# Patient Record
Sex: MALE | URBAN - METROPOLITAN AREA
[De-identification: names, ages, dates, MRNs, and addresses within clinical notes are randomized per-mention and may not be internally consistent; named-entity substitution may affect disease eponyms.]

---

## 2018-10-05 ENCOUNTER — APPOINTMENT (RX ONLY)
Dept: URBAN - METROPOLITAN AREA CLINIC 152 | Facility: CLINIC | Age: 78
Setting detail: DERMATOLOGY
End: 2018-10-05

## 2018-10-05 DIAGNOSIS — L40.0 PSORIASIS VULGARIS: ICD-10-CM

## 2018-10-05 PROCEDURE — ? COUNSELING

## 2018-10-05 PROCEDURE — ? PRESCRIPTION MEDICATION MANAGEMENT

## 2018-10-05 PROCEDURE — 99213 OFFICE O/P EST LOW 20 MIN: CPT

## 2018-10-05 NOTE — PROCEDURE: PRESCRIPTION MEDICATION MANAGEMENT
Detail Level: Zone
Continue Regimen: fluocinonide 0.05% ointment QHS x 1 week, then TAC 0.1% ointment QHS
Render In Strict Bullet Format?: No
Plan: Advised patient to apply steroid ointment under occlusion at night.

## 2018-10-05 NOTE — PROCEDURE: COUNSELING
Detail Level: Zone
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 Informed patient condition is not fungal related. Treatment is with a topical steroid. Patient should apply topical steroid with saran wrap nightly. During the day, he should wrap his foot with a non-stick pad and coban wrap to reduce pain.

## 2018-10-12 ENCOUNTER — APPOINTMENT (RX ONLY)
Dept: URBAN - METROPOLITAN AREA CLINIC 152 | Facility: CLINIC | Age: 78
Setting detail: DERMATOLOGY
End: 2018-10-12

## 2018-10-12 DIAGNOSIS — L40.0 PSORIASIS VULGARIS: ICD-10-CM

## 2018-10-12 PROBLEM — E13.9 OTHER SPECIFIED DIABETES MELLITUS WITHOUT COMPLICATIONS: Status: ACTIVE | Noted: 2018-10-12

## 2018-10-12 PROCEDURE — ? DIAGNOSIS COMMENT

## 2018-10-12 PROCEDURE — 99213 OFFICE O/P EST LOW 20 MIN: CPT

## 2018-10-12 PROCEDURE — ? COUNSELING

## 2018-10-19 ENCOUNTER — APPOINTMENT (RX ONLY)
Dept: URBAN - METROPOLITAN AREA CLINIC 152 | Facility: CLINIC | Age: 78
Setting detail: DERMATOLOGY
End: 2018-10-19

## 2018-10-19 DIAGNOSIS — L40.0 PSORIASIS VULGARIS: ICD-10-CM

## 2018-10-19 PROBLEM — J45.909 UNSPECIFIED ASTHMA, UNCOMPLICATED: Status: ACTIVE | Noted: 2018-10-19

## 2018-10-19 PROBLEM — I10 ESSENTIAL (PRIMARY) HYPERTENSION: Status: ACTIVE | Noted: 2018-10-19

## 2018-10-19 PROCEDURE — ? DIAGNOSIS COMMENT

## 2018-10-19 PROCEDURE — 99213 OFFICE O/P EST LOW 20 MIN: CPT

## 2018-10-19 PROCEDURE — ? COUNSELING

## 2018-10-19 PROCEDURE — ? TREATMENT REGIMEN

## 2018-10-19 ASSESSMENT — LOCATION DETAILED DESCRIPTION DERM
LOCATION DETAILED: RIGHT MEDIAL PLANTAR MIDFOOT
LOCATION DETAILED: LEFT PLANTAR FOREFOOT OVERLYING 3RD METATARSAL

## 2018-10-19 ASSESSMENT — LOCATION SIMPLE DESCRIPTION DERM
LOCATION SIMPLE: LEFT PLANTAR SURFACE
LOCATION SIMPLE: RIGHT PLANTAR SURFACE

## 2018-10-19 ASSESSMENT — LOCATION ZONE DERM: LOCATION ZONE: FEET

## 2018-10-19 NOTE — PROCEDURE: TREATMENT REGIMEN
Action 2: Continue
Sig For Treatment 1 (If Needed): once daily
Detail Level: Simple
Treatment 1: Triamcinolone 0.1% ointment

## 2018-10-19 NOTE — PROCEDURE: DIAGNOSIS COMMENT
Detail Level: Simple
Comment: - Discussed most likely PSO v less likely eczema v. post viral reaction

## 2018-11-07 ENCOUNTER — APPOINTMENT (RX ONLY)
Dept: URBAN - METROPOLITAN AREA CLINIC 152 | Facility: CLINIC | Age: 78
Setting detail: DERMATOLOGY
End: 2018-11-07

## 2018-11-07 DIAGNOSIS — I87.2 VENOUS INSUFFICIENCY (CHRONIC) (PERIPHERAL): ICD-10-CM

## 2018-11-07 DIAGNOSIS — L40.0 PSORIASIS VULGARIS: ICD-10-CM

## 2018-11-07 PROCEDURE — ? COUNSELING

## 2018-11-07 PROCEDURE — 99213 OFFICE O/P EST LOW 20 MIN: CPT

## 2018-11-07 PROCEDURE — ? PRESCRIPTION MEDICATION MANAGEMENT

## 2018-11-07 ASSESSMENT — LOCATION DETAILED DESCRIPTION DERM
LOCATION DETAILED: LEFT DISTAL PRETIBIAL REGION
LOCATION DETAILED: RIGHT DISTAL PRETIBIAL REGION

## 2018-11-07 ASSESSMENT — LOCATION ZONE DERM: LOCATION ZONE: LEG

## 2018-11-07 ASSESSMENT — PAIN INTENSITY VAS: HOW INTENSE IS YOUR PAIN 0 BEING NO PAIN, 10 BEING THE MOST SEVERE PAIN POSSIBLE?: 4/10 PAIN

## 2018-11-07 ASSESSMENT — LOCATION SIMPLE DESCRIPTION DERM
LOCATION SIMPLE: RIGHT PRETIBIAL REGION
LOCATION SIMPLE: LEFT PRETIBIAL REGION

## 2018-11-07 ASSESSMENT — SEVERITY ASSESSMENT: SEVERITY: MILD

## 2018-11-07 NOTE — PROCEDURE: COUNSELING
Detail Level: Simple
Patient Specific Counseling (Will Not Stick From Patient To Patient): Reassured the patient that the TAC 0.1% cream has resolved the PSO on his feet. The pain may be residual from the rash but should resolve in 2 weeks. The pain may also be due to excess pressure from walking or from neuropathy (the patient notes he has neuropathy and takes gabapentin as needed).
Detail Level: Detailed

## 2018-11-07 NOTE — PROCEDURE: PRESCRIPTION MEDICATION MANAGEMENT
Detail Level: Zone
Initiate Treatment: Gabapentin 300 mg PO QHS for the pain (prescribed by his PCP)
Continue Regimen: TAC 0.1% cream BID when rash is active
Render In Strict Bullet Format?: No
Otc Regimen: Compression stockings nightly - patient given address to local medical supply store

## 2018-12-19 ENCOUNTER — APPOINTMENT (RX ONLY)
Dept: URBAN - METROPOLITAN AREA CLINIC 152 | Facility: CLINIC | Age: 78
Setting detail: DERMATOLOGY
End: 2018-12-19

## 2018-12-19 DIAGNOSIS — I87.2 VENOUS INSUFFICIENCY (CHRONIC) (PERIPHERAL): ICD-10-CM

## 2018-12-19 DIAGNOSIS — L40.0 PSORIASIS VULGARIS: ICD-10-CM

## 2018-12-19 DIAGNOSIS — L21.8 OTHER SEBORRHEIC DERMATITIS: ICD-10-CM

## 2018-12-19 PROCEDURE — ? PRESCRIPTION

## 2018-12-19 PROCEDURE — ? PRESCRIPTION MEDICATION MANAGEMENT

## 2018-12-19 PROCEDURE — ? COUNSELING

## 2018-12-19 PROCEDURE — 99213 OFFICE O/P EST LOW 20 MIN: CPT

## 2018-12-19 RX ORDER — FLUOCINONIDE 0.5 MG/ML
SOLUTION TOPICAL QD
Qty: 1 | Refills: 2 | Status: ERX | COMMUNITY
Start: 2018-12-19

## 2018-12-19 RX ADMIN — FLUOCINONIDE: 0.5 SOLUTION TOPICAL at 15:45

## 2018-12-19 ASSESSMENT — LOCATION ZONE DERM: LOCATION ZONE: LEG

## 2018-12-19 ASSESSMENT — LOCATION SIMPLE DESCRIPTION DERM
LOCATION SIMPLE: RIGHT PRETIBIAL REGION
LOCATION SIMPLE: LEFT PRETIBIAL REGION

## 2018-12-19 ASSESSMENT — LOCATION DETAILED DESCRIPTION DERM
LOCATION DETAILED: RIGHT DISTAL PRETIBIAL REGION
LOCATION DETAILED: LEFT DISTAL PRETIBIAL REGION

## 2018-12-19 NOTE — PROCEDURE: PRESCRIPTION MEDICATION MANAGEMENT
Continue Regimen: TAC 0.1% cream BID when active
Detail Level: Zone
Render In Strict Bullet Format?: No
Otc Regimen: Compression stockings nightly

## 2018-12-19 NOTE — PROCEDURE: COUNSELING
Patient Specific Counseling (Will Not Stick From Patient To Patient): Discussed treating with a systemic biological medication, not recommended.\\nAdvised to wrap steroid cream under occlusion for flare ups.
Detail Level: Simple
Detail Level: Detailed

## 2019-03-20 ENCOUNTER — APPOINTMENT (RX ONLY)
Dept: URBAN - METROPOLITAN AREA CLINIC 152 | Facility: CLINIC | Age: 79
Setting detail: DERMATOLOGY
End: 2019-03-20

## 2019-03-20 DIAGNOSIS — L40.0 PSORIASIS VULGARIS: ICD-10-CM

## 2019-03-20 DIAGNOSIS — L57.0 ACTINIC KERATOSIS: ICD-10-CM

## 2019-03-20 DIAGNOSIS — L82.1 OTHER SEBORRHEIC KERATOSIS: ICD-10-CM

## 2019-03-20 DIAGNOSIS — D22 MELANOCYTIC NEVI: ICD-10-CM

## 2019-03-20 PROBLEM — D22.61 MELANOCYTIC NEVI OF RIGHT UPPER LIMB, INCLUDING SHOULDER: Status: ACTIVE | Noted: 2019-03-20

## 2019-03-20 PROBLEM — D22.62 MELANOCYTIC NEVI OF LEFT UPPER LIMB, INCLUDING SHOULDER: Status: ACTIVE | Noted: 2019-03-20

## 2019-03-20 PROBLEM — D22.5 MELANOCYTIC NEVI OF TRUNK: Status: ACTIVE | Noted: 2019-03-20

## 2019-03-20 PROCEDURE — 17000 DESTRUCT PREMALG LESION: CPT

## 2019-03-20 PROCEDURE — 99214 OFFICE O/P EST MOD 30 MIN: CPT | Mod: 25

## 2019-03-20 PROCEDURE — ? COUNSELING

## 2019-03-20 PROCEDURE — ? PRESCRIPTION MEDICATION MANAGEMENT

## 2019-03-20 PROCEDURE — ? LIQUID NITROGEN

## 2019-03-20 PROCEDURE — 17003 DESTRUCT PREMALG LES 2-14: CPT

## 2019-03-20 ASSESSMENT — LOCATION SIMPLE DESCRIPTION DERM
LOCATION SIMPLE: LEFT PRETIBIAL REGION
LOCATION SIMPLE: LEFT CHEEK
LOCATION SIMPLE: RIGHT UPPER ARM
LOCATION SIMPLE: RIGHT UPPER BACK
LOCATION SIMPLE: UPPER BACK
LOCATION SIMPLE: RIGHT CHEEK
LOCATION SIMPLE: LEFT UPPER ARM
LOCATION SIMPLE: RIGHT PRETIBIAL REGION
LOCATION SIMPLE: RIGHT EAR

## 2019-03-20 ASSESSMENT — LOCATION DETAILED DESCRIPTION DERM
LOCATION DETAILED: RIGHT DISTAL PRETIBIAL REGION
LOCATION DETAILED: RIGHT MEDIAL UPPER BACK
LOCATION DETAILED: RIGHT DISTAL POSTERIOR UPPER ARM
LOCATION DETAILED: RIGHT CENTRAL MALAR CHEEK
LOCATION DETAILED: RIGHT SCAPHA
LOCATION DETAILED: SUPERIOR THORACIC SPINE
LOCATION DETAILED: LEFT CENTRAL MALAR CHEEK
LOCATION DETAILED: LEFT DISTAL PRETIBIAL REGION
LOCATION DETAILED: LEFT DISTAL POSTERIOR UPPER ARM

## 2019-03-20 ASSESSMENT — LOCATION ZONE DERM
LOCATION ZONE: EAR
LOCATION ZONE: LEG
LOCATION ZONE: FACE
LOCATION ZONE: ARM
LOCATION ZONE: TRUNK

## 2019-03-20 ASSESSMENT — TOTAL NUMBER OF LESIONS: # OF LESIONS?: 3

## 2019-03-20 NOTE — PROCEDURE: PRESCRIPTION MEDICATION MANAGEMENT
Render In Strict Bullet Format?: No
Detail Level: Zone
Continue Regimen: TAC 0.1% cream BID when active

## 2019-03-20 NOTE — PROCEDURE: COUNSELING
Detail Level: Detailed
Detail Level: Zone
Patient Specific Counseling (Will Not Stick From Patient To Patient): Discussed treating with a systemic biological medication, not recommended.\\nAdvised to wrap steroid cream under occlusion for flare ups.
Detail Level: Simple

## 2022-10-12 ENCOUNTER — HOSPITAL ENCOUNTER (EMERGENCY)
Facility: HOSPITAL | Age: 82
Discharge: HOME | End: 2022-10-12
Attending: EMERGENCY MEDICINE
Payer: MEDICARE

## 2022-10-12 VITALS
WEIGHT: 160 LBS | DIASTOLIC BLOOD PRESSURE: 74 MMHG | HEART RATE: 47 BPM | HEIGHT: 71 IN | SYSTOLIC BLOOD PRESSURE: 160 MMHG | RESPIRATION RATE: 16 BRPM | OXYGEN SATURATION: 95 % | BODY MASS INDEX: 22.4 KG/M2 | TEMPERATURE: 97 F

## 2022-10-12 DIAGNOSIS — S81.801A WOUND OF RIGHT LOWER EXTREMITY, INITIAL ENCOUNTER: Primary | ICD-10-CM

## 2022-10-12 DIAGNOSIS — S81.802A WOUND OF LEFT LOWER EXTREMITY, INITIAL ENCOUNTER: ICD-10-CM

## 2022-10-12 LAB
ANION GAP SERPL CALC-SCNC: 11 MMOL/L (ref 3–11)
BASOPHILS # BLD AUTO: 0.1 10*3/UL
BASOPHILS NFR BLD AUTO: 0.7 % (ref 0–2)
BUN SERPL-MCNC: 34 MG/DL (ref 7–25)
CALCIUM SERPL-MCNC: 9.7 MG/DL (ref 8.6–10.3)
CHLORIDE SERPL-SCNC: 107 MMOL/L (ref 98–107)
CO2 SERPL-SCNC: 24 MMOL/L (ref 21–32)
CREAT SERPL-MCNC: 1.18 MG/DL (ref 0.7–1.3)
EOSINOPHIL # BLD AUTO: 0.3 10*3/UL
EOSINOPHIL NFR BLD AUTO: 2.5 % (ref 0–3)
ERYTHROCYTE [DISTWIDTH] IN BLOOD BY AUTOMATED COUNT: 14 % (ref 11.5–15)
GFR SERPL CREATININE-BSD FRML MDRD: 62 ML/MIN/1.73M*2
GLUCOSE SERPL-MCNC: 136 MG/DL (ref 70–105)
HCT VFR BLD AUTO: 32.7 % (ref 38–50)
HGB BLD-MCNC: 10.6 G/DL (ref 13.2–17.2)
LYMPHOCYTES # BLD AUTO: 1.6 10*3/UL
LYMPHOCYTES NFR BLD AUTO: 15 % (ref 15–47)
MCH RBC QN AUTO: 30.6 PG (ref 29–34)
MCHC RBC AUTO-ENTMCNC: 32.3 G/DL (ref 32–36)
MCV RBC AUTO: 94.5 FL (ref 82–97)
MONOCYTES # BLD AUTO: 1 10*3/UL
MONOCYTES NFR BLD AUTO: 9.2 % (ref 5–13)
NEUTROPHILS # BLD AUTO: 7.7 10*3/UL
NEUTROPHILS NFR BLD AUTO: 72.6 % (ref 46–70)
PLATELET # BLD AUTO: 225 10*3/UL (ref 130–350)
PMV BLD AUTO: 9.2 FL (ref 6.9–10.8)
POTASSIUM SERPL-SCNC: 3.9 MMOL/L (ref 3.5–5.1)
RBC # BLD AUTO: 3.46 10*6/ΜL (ref 4.1–5.8)
SODIUM SERPL-SCNC: 142 MMOL/L (ref 135–145)
WBC # BLD AUTO: 10.6 10*3/UL (ref 3.7–9.6)

## 2022-10-12 PROCEDURE — 99283 EMERGENCY DEPT VISIT LOW MDM: CPT | Performed by: EMERGENCY MEDICINE

## 2022-10-12 PROCEDURE — 80048 BASIC METABOLIC PNL TOTAL CA: CPT | Performed by: STUDENT IN AN ORGANIZED HEALTH CARE EDUCATION/TRAINING PROGRAM

## 2022-10-12 PROCEDURE — 85025 COMPLETE CBC W/AUTO DIFF WBC: CPT | Performed by: STUDENT IN AN ORGANIZED HEALTH CARE EDUCATION/TRAINING PROGRAM

## 2022-10-12 PROCEDURE — 99282 EMERGENCY DEPT VISIT SF MDM: CPT | Performed by: EMERGENCY MEDICINE

## 2022-10-12 PROCEDURE — 36415 COLL VENOUS BLD VENIPUNCTURE: CPT | Performed by: STUDENT IN AN ORGANIZED HEALTH CARE EDUCATION/TRAINING PROGRAM

## 2022-10-12 RX ORDER — TAMSULOSIN HYDROCHLORIDE 0.4 MG/1
0.4 CAPSULE ORAL DAILY
COMMUNITY
Start: 2022-08-30

## 2022-10-12 RX ORDER — METFORMIN HYDROCHLORIDE 500 MG/1
1000 TABLET ORAL
COMMUNITY

## 2022-10-12 RX ORDER — HYDRALAZINE HYDROCHLORIDE 25 MG/1
25 TABLET, FILM COATED ORAL
COMMUNITY

## 2022-10-12 RX ORDER — BLOOD-GLUCOSE SENSOR
EACH MISCELLANEOUS
COMMUNITY
Start: 2022-10-05

## 2022-10-12 RX ORDER — FUROSEMIDE 20 MG/1
20 TABLET ORAL EVERY OTHER DAY
COMMUNITY
Start: 2022-08-21

## 2022-10-12 RX ORDER — APIXABAN 2.5 MG/1
2.5 TABLET, FILM COATED ORAL 2 TIMES DAILY
COMMUNITY
Start: 2022-09-11

## 2022-10-12 RX ORDER — ALBUTEROL SULFATE 90 UG/1
1-2 INHALANT RESPIRATORY (INHALATION)
COMMUNITY

## 2022-10-12 RX ORDER — TADALAFIL 5 MG/1
5 TABLET ORAL DAILY
COMMUNITY
Start: 2022-09-04

## 2022-10-12 RX ORDER — FAMOTIDINE 40 MG/1
40 TABLET, FILM COATED ORAL 2 TIMES DAILY
COMMUNITY
Start: 2022-09-18

## 2022-10-12 RX ORDER — TESTOSTERONE 20.25 MG/1.25G
20.25 GEL TOPICAL DAILY
COMMUNITY
Start: 2022-02-22

## 2022-10-12 RX ORDER — LOSARTAN POTASSIUM AND HYDROCHLOROTHIAZIDE 25; 100 MG/1; MG/1
1 TABLET ORAL
COMMUNITY

## 2022-10-12 RX ORDER — FLUTICASONE FUROATE AND VILANTEROL TRIFENATATE 200; 25 UG/1; UG/1
POWDER RESPIRATORY (INHALATION)
COMMUNITY
Start: 2022-09-26

## 2022-10-12 RX ORDER — INSULIN ASPART 100 [IU]/ML
INJECTION, SOLUTION INTRAVENOUS; SUBCUTANEOUS
COMMUNITY
Start: 2022-07-08

## 2022-10-12 RX ORDER — ATORVASTATIN CALCIUM 20 MG/1
20 TABLET, FILM COATED ORAL NIGHTLY
COMMUNITY
Start: 2022-09-10

## 2022-10-12 RX ORDER — POTASSIUM CHLORIDE 750 MG/1
10 TABLET, EXTENDED RELEASE ORAL
COMMUNITY

## 2022-10-12 RX ORDER — TRIAMCINOLONE ACETONIDE 1 MG/G
OINTMENT TOPICAL
COMMUNITY
Start: 2022-03-21

## 2022-10-12 RX ORDER — BUDESONIDE AND FORMOTEROL FUMARATE DIHYDRATE 160; 4.5 UG/1; UG/1
2 AEROSOL RESPIRATORY (INHALATION)
COMMUNITY

## 2022-10-12 RX ORDER — BLOOD-GLUCOSE TRANSMITTER
EACH MISCELLANEOUS
COMMUNITY
Start: 2022-09-07

## 2022-10-12 RX ORDER — MONTELUKAST SODIUM 10 MG/1
10 TABLET ORAL EVERY EVENING
COMMUNITY

## 2022-10-12 RX ORDER — INSULIN DEGLUDEC 100 U/ML
INJECTION, SOLUTION SUBCUTANEOUS
COMMUNITY
Start: 2022-10-07

## 2022-10-12 RX ORDER — OLMESARTAN MEDOXOMIL AND HYDROCHLOROTHIAZIDE 40/12.5 40; 12.5 MG/1; MG/1
1 TABLET ORAL DAILY
COMMUNITY
Start: 2022-09-13

## 2022-10-12 RX ORDER — METFORMIN HYDROCHLORIDE 1000 MG/1
1000 TABLET ORAL 2 TIMES DAILY
COMMUNITY
Start: 2022-09-10

## 2022-10-12 RX ORDER — LANOLIN ALCOHOL/MO/W.PET/CERES
1000 CREAM (GRAM) TOPICAL DAILY
COMMUNITY

## 2022-10-12 RX ORDER — ERYTHROMYCIN 5 MG/G
OINTMENT OPHTHALMIC
COMMUNITY
Start: 2021-12-08

## 2022-10-12 RX ORDER — COVID-19 ANTIGEN TEST
KIT MISCELLANEOUS
COMMUNITY
Start: 2022-09-11

## 2022-10-12 RX ORDER — PEN NEEDLE, DIABETIC 32GX 5/32"
NEEDLE, DISPOSABLE MISCELLANEOUS
COMMUNITY
Start: 2022-08-07

## 2022-10-12 RX ORDER — NAPROXEN SODIUM 220 MG/1
81 TABLET, FILM COATED ORAL
COMMUNITY

## 2022-10-12 RX ORDER — MULTIVITAMIN WITH IRON
1 TABLET ORAL DAILY
COMMUNITY

## 2022-10-12 RX ORDER — INSULIN GLARGINE 100 [IU]/ML
30 INJECTION, SOLUTION SUBCUTANEOUS
COMMUNITY

## 2022-10-12 ASSESSMENT — ENCOUNTER SYMPTOMS
WOUND: 1
FEVER: 0
DIFFICULTY URINATING: 0
VOMITING: 0
COUGH: 0
BRUISES/BLEEDS EASILY: 1
WEAKNESS: 0
HEADACHES: 0
PALPITATIONS: 0
DIARRHEA: 0
COLOR CHANGE: 1
CHILLS: 0
ABDOMINAL PAIN: 0
DIZZINESS: 0
AGITATION: 0
FATIGUE: 0
SHORTNESS OF BREATH: 0
NAUSEA: 0
ABDOMINAL DISTENTION: 0
WHEEZING: 0

## 2022-10-12 NOTE — TREATMENT PLAN
Wound care nurses assessed Grant's lower leg wounds, provided wound care and offered recommendations to him as he will be going home to [facility] on Friday.     Right leg: 3+ PE, shiny, thinned skin; partial-thickness wound on distal posterior aspect. Cleansed with normal saline, applied layer of bacitracin, covered with silicone bordered foam dressing.    Left leg: trace edema, thinned skin; traumatic wound to lateral aspect from one of his grandchildren accidentally running something into his leg. After wound was cleansed, applied Bacitracin, contact layer and silicone bordered foam dressing.     Supplies given to patient which should last until he gets back home later this week. Discussed where these dressing can be obtained-online, [employer], some drug stores.     Strongly recommended Grant to have arterial studies done. He verbalized he has been having pain in the back of his legs at night which goes away after he sits up and moves around. No hair growth is seen on his legs.                10/12/22 1230   Wound 10/12/22 Blister - broken Tibial Right lower leg swelling, redness and open ulcer to back of leg   Date First Assessed/Time First Assessed: 10/12/22 1144   Pre-Existing Wound: Yes  Wound Type: Blister - broken  WCT Non-staged Ulcer Description: Full thickness with fat layer exposed  Location Descriptor: Right;Posterior;Distal  Location: Tibial  Wou...   Wound Image      Dressing Status Intact;Dry   Dressing Changed Yes   Drainage Amount Small (25% or less of dressing)   Drainage Description Serous;Subramanian   Wound Odor None   Wound Cleansing Normal saline   Site Assessment Prior to Treatment Red;Pink   Margins Defined edges   Surrounding Skin Assessment Pink;Thinned skin;Shiny   Wound Bed Topical Treatment Antibiotic ointment   Dressing Contact layer;Silicone Border Foam   Site Comment right posterior lower leg appears to be related to an edema blister site and left lateral lower leg is a traumatic wound    Date Measured 10/12/22   Wound Length (cm) 2.8 cm   Wound Width (cm) 2.5 cm   Wound Depth (cm) 0.1   Smallest to Largest Wound Range left lateral lower le.5x3x0.2cm   Calculated Wound Size (cm^3)  0.7 cm^3   Calculated Wound Size (cm^2) FOR BILLING REASON 7 cm^2   Debridement Performed by RN No

## 2022-10-12 NOTE — ED PROVIDER NOTES
HPI:  Chief Complaint   Patient presents with    Recurrent Skin Infections     Leg swelling and cellulitis for the past 10 days.  Open wound noted to right lower leg posteriorly.       82-year-old male presents with concern after telemedicine visit with his primary care provider back in [place].  He presented a new right ankle posterior superficial lesion that is very painful.  Patient denies any fevers or chills, no chest pain or shortness of breath.  He states that when he arrived here about a week ago he had chills and felt achy for 1 to 2 days but that has resolved completely prior to this.  His appetite is good and he is eating and drinking without difficulty.  Patient states that he has had similar wounds before usually start with a blister and that was how this wound started.  The blister presented on the fourth or 5 October and has ruptured since then.  Patient notes that his legs do seem to be more swollen than they usually are without any improvement with his Lasix or with elevation placing a pillow under them while laying in the bed.  We are acquiring outside records for current medication list.  Patient does have AICD and diabetes that is insulin controlled as well as hypertension.  Patient is anticoagulated taking 2.5 mg of Eliquis twice daily.      HISTORY:  Past Medical History:   Diagnosis Date    Diabetes mellitus (CMS/HCC) ([facility])     Hypertension        History reviewed. No pertinent surgical history.    History reviewed. No pertinent family history.    Social History     Tobacco Use    Smoking status: Never    Smokeless tobacco: Never   Vaping Use    Vaping Use: Never used   Substance Use Topics    Alcohol use: Never    Drug use: Never         ROS:  Review of Systems   Constitutional:  Negative for chills, fatigue and fever.   HENT:  Negative for congestion.    Respiratory:  Negative for cough, shortness of breath and wheezing.    Cardiovascular:  Negative for chest pain and palpitations.    Gastrointestinal:  Negative for abdominal distention, abdominal pain, diarrhea, nausea and vomiting.   Genitourinary:  Negative for difficulty urinating.   Skin:  Positive for color change (Lower extremity skin color changes) and wound (Right ankle posterior wound, left anterior shin wound).   Neurological:  Negative for dizziness, weakness and headaches.   Hematological:  Bruises/bleeds easily.   Psychiatric/Behavioral:  Negative for agitation and behavioral problems.      PE:  ED Triage Vitals [10/12/22 1137]   Temp Heart Rate Resp BP SpO2   36.1 °C (97 °F) 57 20 (!) 200/85 98 %      Mean BP (mmHg) Temp Source Heart Rate Source Patient Position BP Location   -- Temporal -- -- --      FiO2 (%)       --           Physical Exam  Vitals reviewed.   Constitutional:       General: He is not in acute distress.     Appearance: Normal appearance. He is normal weight. He is not ill-appearing, toxic-appearing or diaphoretic.   HENT:      Head: Normocephalic and atraumatic.      Nose: Nose normal.      Mouth/Throat:      Mouth: Mucous membranes are moist.      Pharynx: Oropharynx is clear. No oropharyngeal exudate or posterior oropharyngeal erythema.   Eyes:      General: No scleral icterus.        Right eye: No discharge.         Left eye: No discharge.      Extraocular Movements: Extraocular movements intact.      Conjunctiva/sclera: Conjunctivae normal.   Cardiovascular:      Rate and Rhythm: Normal rate and regular rhythm.      Pulses: Normal pulses.      Heart sounds: Murmur (Chronic) heard.   Abdominal:      General: Bowel sounds are normal. There is no distension.      Palpations: Abdomen is soft.      Tenderness: There is no abdominal tenderness.   Skin:     General: Skin is warm and dry.      Findings: Lesion (Left anterior shin lesion from accidental injury.  Right ankle posterior superficial wound secondary to blister, edema and chronic venous stasis.) present.   Neurological:      General: No focal deficit  present.      Mental Status: He is alert and oriented to person, place, and time. Mental status is at baseline.   Psychiatric:         Mood and Affect: Mood normal.         Behavior: Behavior normal.       ED LABS:  Labs Reviewed   CBC WITH AUTO DIFFERENTIAL - Abnormal       Result Value    WBC 10.6 (*)     RBC 3.46 (*)     Hemoglobin 10.6 (*)     Hematocrit 32.7 (*)     MCV 94.5      MCH 30.6      MCHC 32.3      RDW 14.0      Platelets 225      MPV 9.2      Neutrophils% 72.6 (*)     Lymphocytes% 15.0      Monocytes% 9.2      Eosinophils% 2.5      Basophils% 0.7      ANC (auto diff) 7.70      Lymphocytes Absolute 1.60      Monocytes Absolute 1.00      Eosinophils Absolute 0.30      Basophils Absolute 0.10     BASIC METABOLIC PANEL - Abnormal    Sodium 142      Potassium 3.9      Chloride 107      CO2 24      BUN 34 (*)     Creatinine 1.18      Glucose 136 (*)     Calcium 9.7      Anion Gap 11      eGFR 62      Narrative:     Calculation based on the 2021 Chronic Kidney Disease Epidemiology Collaboration (CKD-EPI) equation refit without adjustment for race.         ED IMAGES:  No orders to display       ED PROCEDURES:  Procedures    ED COURSE:  ED Course as of 10/12/22 1400   Wed Oct 12, 2022   1246 Patient seen and evaluated.  Wounds examined and found to have good granulation tissue, no signs of infection but appear to be healing.  Bilateral feet palpated and dopplered with biphasic pulses noted bilaterally in all 4 locations. [LH]   1246 Discussed patient with wound care who came into the emergency department to provide recommendations after evaluation for continued bandage and wound care. [LH]   1259 Given the patient is on Eliquis daily has no missed doses and based on physical exam VTE is very unlikely. [LH]      ED Course User Index  [LH] Lee Hosler, DO   Patient seen and evaluated by wound care, we appreciate their assistance and recommendations for this patient.  He will continue with wound care and  treatment and will watch out for signs of infection.  Patient does take his Eliquis daily with no missed doses, low possibility for VTE and physical exam does not correlate.  Recommend additional follow-up with PCP as needed.  Patient will be discharged home in stable condition.       Sepsis Quality Bundle         MDM:  MDM      Final diagnoses:   [S81.801A] Wound of right lower extremity, initial encounter   [S81.802A] Wound of left lower extremity, initial encounter     10/12/2022  1:01 PM                   Lee Hosler, DO  Resident  10/12/22 1400

## 2022-10-12 NOTE — DISCHARGE INSTRUCTIONS
Today you were seen and evaluated for 2 superficial skin wounds.  Evaluated by wound care who applied some different dressings and provided additional recommendations.  Continue with their recommendations and watch out for signs of infection which would include thick white drainage from the injuries, any redness spreading up the legs or any fevers or chills.  If these occur we would want you to come back for additional evaluation.